# Patient Record
Sex: FEMALE | Race: AMERICAN INDIAN OR ALASKA NATIVE | NOT HISPANIC OR LATINO | Employment: FULL TIME | URBAN - METROPOLITAN AREA
[De-identification: names, ages, dates, MRNs, and addresses within clinical notes are randomized per-mention and may not be internally consistent; named-entity substitution may affect disease eponyms.]

---

## 2017-02-07 ENCOUNTER — OFFICE VISIT (OUTPATIENT)
Dept: MEDICAL GROUP | Facility: MEDICAL CENTER | Age: 37
End: 2017-02-07
Payer: COMMERCIAL

## 2017-02-07 VITALS
SYSTOLIC BLOOD PRESSURE: 112 MMHG | HEART RATE: 89 BPM | BODY MASS INDEX: 33.34 KG/M2 | HEIGHT: 62 IN | WEIGHT: 181.2 LBS | DIASTOLIC BLOOD PRESSURE: 88 MMHG | OXYGEN SATURATION: 91 % | TEMPERATURE: 98.6 F

## 2017-02-07 DIAGNOSIS — Z01.419 ENCOUNTER FOR GYNECOLOGICAL EXAMINATION: ICD-10-CM

## 2017-02-07 DIAGNOSIS — G89.29 CHRONIC PAIN OF RIGHT KNEE: ICD-10-CM

## 2017-02-07 DIAGNOSIS — M25.561 CHRONIC PAIN OF RIGHT KNEE: ICD-10-CM

## 2017-02-07 PROCEDURE — 99214 OFFICE O/P EST MOD 30 MIN: CPT | Performed by: FAMILY MEDICINE

## 2017-02-07 RX ORDER — IBUPROFEN 200 MG
400 TABLET ORAL EVERY 4 HOURS PRN
COMMUNITY
Start: 2017-02-07

## 2017-02-07 ASSESSMENT — PATIENT HEALTH QUESTIONNAIRE - PHQ9: CLINICAL INTERPRETATION OF PHQ2 SCORE: 0

## 2017-02-07 NOTE — MR AVS SNAPSHOT
"        Kristin Sequeira   2017 3:40 PM   Office Visit   MRN: [id number]    Department:  [place] [address]   Dept Phone:  [phone]    Description:  Female : 1980   Provider:  Asif Chopra M.D.           Reason for Visit     Establish Care (R) knee dislocated 4 months ago      Allergies as of 2017     Allergen Noted Reactions    Shellfish Allergy 2017   Anaphylaxis, Hives      You were diagnosed with     Chronic pain of right knee   [7169825]       Encounter for gynecological examination   [[id number]]         Vital Signs     Blood Pressure Pulse Temperature Height Weight Body Mass Index    112/88 mmHg 89 37 °C (98.6 °F) 1.575 m (5' 2.01\") 82.192 kg (181 lb 3.2 oz) 33.13 kg/m2    Oxygen Saturation Last Menstrual Period Breastfeeding? Smoking Status          91% 2017 No Light Tobacco Smoker        Basic Information     Date Of Birth Sex Race Ethnicity Preferred Language    1980 Female  or  Non- English      Your appointments     Oct 11, 2017  4:20 PM   ANNUAL EXAM PREVENTATIVE with Asif Chopra M.D.   [facility]    [address]  [address]  [place] [address] [phone]              Problem List              ICD-10-CM Priority Class Noted - Resolved    Chronic pain of right knee M25.561, G89.29   2017 - Present      Health Maintenance        Date Due Completion Dates    IMM DTaP/Tdap/Td Vaccine (1 - Tdap) 1998 ---    PAP SMEAR 2000 ---    IMM INFLUENZA (1) 2016 ---            Current Immunizations     No immunizations on file.      Below and/or attached are the medications your provider expects you to take. Review all of your home medications and newly ordered medications with your provider and/or pharmacist. Follow medication instructions as directed by your provider and/or pharmacist. Please keep your medication list with you and share with your provider. " Update the information when medications are discontinued, doses are changed, or new medications (including over-the-counter products) are added; and carry medication information at all times in the event of emergency situations     Allergies:  SHELLFISH ALLERGY - Anaphylaxis,Hives               Medications  Valid as of: February 07, 2017 -  4:29 PM    Generic Name Brand Name Tablet Size Instructions for use    Ibuprofen (Tab) MOTRIN 200 MG Take 2 Tabs by mouth every four hours as needed.        .                 Medicines prescribed today were sent to:     [facility] [address] [place] [address], [place] [address] [facility] [address]  [address] [facility] [address]    [address] [place] [address]    Phone: [phone] Fax: [phone]    Open 24 Hours?: No      Medication refill instructions:       If your prescription bottle indicates you have medication refills left, it is not necessary to call your provider’s office. Please contact your pharmacy and they will refill your medication.    If your prescription bottle indicates you do not have any refills left, you may request refills at any time through one of the following ways: The online CHEQROOM system (except Urgent Care), by calling your provider’s office, or by asking your pharmacy to contact your provider’s office with a refill request. Medication refills are processed only during regular business hours and may not be available until the next business day. Your provider may request additional information or to have a follow-up visit with you prior to refilling your medication.   *Please Note: Medication refills are assigned a new Rx number when refilled electronically. Your pharmacy may indicate that no refills were authorized even though a new prescription for the same medication is available at the pharmacy. Please request the medicine by name with the pharmacy before contacting your provider for a refill.        Referral     A referral request has been  sent to our patient care coordination department. Please allow 3-5 business days for us to process this request and contact you either by phone or mail. If you do not hear from us by the 5th business day, please call us at [phone].        Instructions    Tips for Musculoskeletal pain:     1) RICE therapy:    Rest the injured area as much as possible for the first 24-48hours after the   injury.  Then, think of ways to modify your activity to not re-injure the   same area or cause new injury to a different musculoskeletal group.   Ice the injury whenever there is inflammation/swelling, increased warmth to the   joint, or at the end of a long day of use.  Do not place ice directly on skin,   and use ice therapy for no longer than 15-20 minutes at a time.   Compresses.  In addition to the ice compresses:     a) Use warm moist compresses (either a heating pad or a  clean sock    filled with rice or beans then microwaved to a comfortable     warmth) for 15-20minutes, particularly first thing in the morning.     An alternative could be to have a warm to hot shower     (non-scalding) beat directly on the affected area.    b) A compressive bandage may be used to provide support, decrease    swelling, and potentially improve comfort.   Elevate affected area to above the level of your heart.  This increases lymphatic   drainage from the affected area, and thus decreases swelling/pain.     2) Medications:   NSAID Therapy to decrease swelling/inflammation of muscles, and nerves, as   well as to provide pain relief: Ibuprofen 400mg by mouth up to every 4 hours as needed   Topical analgesia: Patches (Menthol, Methyl Salicylate, Camphor, Capsaicin, Lidocaine) or Creams or Balms (Menthol, Methyl Salicylate, Camphor, Capsaicin)     3) Physical Therapy and General Instructions:   Begin normal activities as pain recedes.           [employer]UKZ Undergarments Access Code: [email or web address]  Expires: 2/10/2017  3:05 PM    TUKZ Undergarments  A secure, online  tool to manage your health information     [employer]’s AJAX Street® is a secure, online tool that connects you to your personalized health information from the privacy of your home -- day or night - making it very easy for you to manage your healthcare. Once the activation process is completed, you can even access your medical information using the AJAX Street lida, which is available for free in the Apple Lida store or Google Play store.     AJAX Street provides the following levels of access (as shown below):   My Chart Features   [facility] Doctor [facility]  Specialists [facility]  Urgent  Care [facility]  Primary Care  Doctor   Email your healthcare team securely and privately 24/7 X X X    Manage appointments: schedule your next appointment; view details of past/upcoming appointments X      Request prescription refills. X      View recent personal medical records, including lab and immunizations X X X X   View health record, including health history, allergies, medications X X X X   Read reports about your outpatient visits, procedures, consult and ER notes X X X X   See your discharge summary, which is a recap of your hospital and/or ER visit that includes your diagnosis, lab results, and care plan. X X       How to register for AJAX Street:  1. Go to  [email or web address].  2. Click on the Sign Up Now box, which takes you to the New Member Sign Up page. You will need to provide the following information:  a. Enter your AJAX Street Access Code exactly as it appears at the top of this page. (You will not need to use this code after you’ve completed the sign-up process. If you do not sign up before the expiration date, you must request a new code.)   b. Enter your date of birth.   c. Enter your home email address.   d. Click Submit, and follow the next screen’s instructions.  3. Create a AJAX Street ID. This will be your AJAX Street login ID and cannot be changed, so think of one that is secure and easy to remember.  4. Create a  Apps Genius password. You can change your password at any time.  5. Enter your Password Reset Question and Answer. This can be used at a later time if you forget your password.   6. Enter your e-mail address. This allows you to receive e-mail notifications when new information is available in Apps Genius.  7. Click Sign Up. You can now view your health information.    For assistance activating your Apps Genius account, call [phone]

## 2017-02-08 NOTE — ASSESSMENT & PLAN NOTE
"Patient currently with R knee pain and feelings of right knee instability. Patient states that she does have some paresthesias/tingling into her calf and ankle when first awakening, that resolves after about an hour after waking up and be more mobile.    Patient with history of right knee dislocation while playing softball in November 2016. Patient states that she was running to catch a ball, somehow twisted her right leg, and \"kneecap ended up on the inside of my leg.\"  Patient's friends then pulled the knee into position, she then placed an elastic brace on her right knee than complaining supple. Afterwards, patient went out with her friends, then presented to medical evaluation the next day. The x-rays were performed, and review of the revealed that she did not have any acute fractures. Patient was advised to use RICE therapy, does not recall being told to do physical therapy.      "

## 2017-02-08 NOTE — PATIENT INSTRUCTIONS
Tips for Musculoskeletal pain:     1) RICE therapy:    Rest the injured area as much as possible for the first 24-48hours after the   injury.  Then, think of ways to modify your activity to not re-injure the   same area or cause new injury to a different musculoskeletal group.   Ice the injury whenever there is inflammation/swelling, increased warmth to the   joint, or at the end of a long day of use.  Do not place ice directly on skin,   and use ice therapy for no longer than 15-20 minutes at a time.   Compresses.  In addition to the ice compresses:     a) Use warm moist compresses (either a heating pad or a  clean sock    filled with rice or beans then microwaved to a comfortable     warmth) for 15-20minutes, particularly first thing in the morning.     An alternative could be to have a warm to hot shower     (non-scalding) beat directly on the affected area.    b) A compressive bandage may be used to provide support, decrease    swelling, and potentially improve comfort.   Elevate affected area to above the level of your heart.  This increases lymphatic   drainage from the affected area, and thus decreases swelling/pain.     2) Medications:   NSAID Therapy to decrease swelling/inflammation of muscles, and nerves, as   well as to provide pain relief: Ibuprofen 400mg by mouth up to every 4 hours as needed   Topical analgesia: Patches (Menthol, Methyl Salicylate, Camphor, Capsaicin, Lidocaine) or Creams or Balms (Menthol, Methyl Salicylate, Camphor, Capsaicin)     3) Physical Therapy and General Instructions:   Begin normal activities as pain recedes.

## 2017-02-10 NOTE — PROGRESS NOTES
"Subjective:     Chief Complaint   Patient presents with   • Establish Care     (R) knee dislocated 4 months ago       History of Present Illness:  Kristin Sequeira is a 36 y.o. female patient new to [facility] who presents today to discuss R knee pain as well as to establish primary medical care. PMH, PSH, Social History, Medications, Allergies, FMH all reviewed as documented:    Chronic pain of right knee  Patient currently with R knee pain and feelings of right knee instability. Patient states that she does have some paresthesias/tingling into her calf and ankle when first awakening, that resolves after about an hour after waking up and be more mobile.    Patient with history of right knee dislocation while playing softball in November 2016. Patient states that she was running to catch a ball, somehow twisted her right leg, and \"kneecap ended up on the inside of my leg.\"  Patient's friends then pulled the knee into position, she then placed an elastic brace on her right knee than complaining supple. Afterwards, patient went out with her friends, then presented to medical evaluation the next day. The x-rays were performed, and review of the revealed that she did not have any acute fractures. Patient was advised to use RICE therapy, does not recall being told to do physical therapy.          Patient Active Problem List    Diagnosis Date Noted   • Chronic pain of right knee 02/07/2017       Additional History:   Allergies:    Shellfish allergy     Medications:     Current Outpatient Prescriptions Ordered in [facility]   Medication Sig Dispense Refill   • ibuprofen (MOTRIN) 200 MG Tab Take 2 Tabs by mouth every four hours as needed.       No current Epic-ordered facility-administered medications on file.        Past Medical History:   History reviewed. No pertinent past medical history.     Past Surgical History:     Past Surgical History   Procedure Laterality Date   • Tubal coagulation laparoscopic bilateral   " "       Social History:     Social History   Substance Use Topics   • Smoking status: Light Tobacco Smoker   • Smokeless tobacco: Never Used      Comment: 1 CIG EVERY 2 WEEKS   • Alcohol Use: 1.8 oz/week     3 Cans of beer per week        Family History:     Family Status   Relation Status Death Age   • Mother     • Father Alive    • Sister Alive    • Maternal Grandmother     • Maternal Grandfather     • Paternal Grandmother     • Paternal Grandfather          Family History   Problem Relation Age of Onset   • Cancer Mother      \"Bone Cancer\"   • Kidney cancer Father    • Bladder cancer Father    • Alcohol/Drug Father      Smoker   • Cancer Father    • No Known Problems Sister    • Cancer Paternal Grandmother      Breast CA   • Hypertension Paternal Grandmother    • Cancer Paternal Grandfather      Colon, Lung       ROS:     - Constitutional: Negative for fever, chills, unexpected weight change, and fatigue/generalized weakness.     - HEENT: Negative for headaches, vision changes, hearing changes, ear pain, ear discharge, rhinorrhea, sinus congestion, sore throat, and neck pain.      - Respiratory: Negative for cough, sputum production, chest congestion, dyspnea, wheezing, and crackles.      - Cardiovascular: Negative for chest pain, palpitations, orthopnea, and bilateral lower extremity edema.     - Gastrointestinal: Negative for heartburn, nausea, vomiting, abdominal pain, hematochezia, melena, diarrhea, constipation, and greasy/foul-smelling stools.     - Genitourinary: Negative for dysuria, polyuria, hematuria, pyuria, urinary urgency, and urinary incontinence.    - Musculoskeletal: Negative for myalgias, back pain, and joint pain.     - Skin: Negative for rash, itching, cyanotic skin color change.     - Neurological: Negative for dizziness, tingling, tremors, focal sensory deficit, focal weakness and headaches.     - NOTE: All other systems reviewed and are negative, " "except as in HPI.     Objective:   Physical Exam:    Vitals: Blood pressure 112/88, pulse 89, temperature 37 °C (98.6 °F), height 1.575 m (5' 2.01\"), weight 82.192 kg (181 lb 3.2 oz), last menstrual period 01/27/2017, SpO2 91 %, not currently breastfeeding.   BMI: Body mass index is 33.13 kg/(m^2).   General/Constitutional: Vitals as above, Well nourished, well developed female in no acute distress   Head/Eyes:  - Head is grossly normal & atraumatic  - Bilateral conjunctivae clear and not injected, bilateral EOMI, bilateral PERRL   ENT:   - Bilateral external ears grossly normal in appearance, external auditory canals clear & bilateral TMs visualized with appropriate cone of light reflex, hearing grossly intact  - External nares normal in appearance and without discharge/bleeding, bilateral turbinates non-erythematous/non-edematous and without discharge/bleeding  - Good dentition ,  posterior oropharynx without erythema/edema/exudates  Neck: Neck supple, no masses, neck non-tender to palpation, no thyromegaly/goiter   Respiratory: No respiratory distress, bilateral lungs are clear to auscultation in all lung fields (anterior/lateral/posterior), no wheezing/rhonchi/rales   Cardiovascular: Regular rate and rhythm without murmur/gallops/rubs, distal pulses equal and 2+ bilaterally (radial, posterior tibial), no bilateral lower extremity edema   Gastrointestinal: Abdomen resonant to percussion, Bowel sounds present in all 4 quadrants, abdomen non-tender to light and deep palpation, hepatosplenomegaly grossly absent, ventral/umbilical hernias absent    MSK: Gait grossly normal & not antalgic, no tenderness to percussion of vertebral processes, no CVAT, no bilateral SI joint tenderness, R knee exam positive for tenderness to direct palpation of medial and lateral joint spaces otherwise negative exam for right knee including (Mable's, anterior/posterior drawer, patellar tendon tenderness to compression, quadriceps " tendon tenderness to compression, tenderness to direct palpation of patella, tenderness to direct palpation of medial and lateral collateral ligaments, patellar mobility),    Integumentary: No apparent rashes   Neuro: Gross motor movement intact in all 4 extremities, Gross sensation intact to extremities and trunk, Gait grossly normal and not antalgic   Psych: Judgment grossly appropriate, no apparent depression/anxiety    Health Maintenance:     - Believes pap is uptodate    - I have requested previous records, and will update accordingly.    Assessment and Plan:   1. Chronic pain of right knee  Stable, secondary to early R knee OA vs. Pain 2/2 compressive effect d/t excess weight.   - ibuprofen (MOTRIN) 200 MG Tab; Take 2 Tabs by mouth every four hours as needed.   - REFERRAL TO PHYSICAL THERAPY Reason for Therapy: Eval/Treat/Report    2. Encounter for gynecological examination  Referral to [facility].   - REFERRAL TO GYNECOLOGY      PLEASE NOTE: This dictation was created using voice recognition software. I have made every reasonable attempt to correct obvious errors, but I expect that there are errors of grammar and possibly content that I did not discover before finalizing the note.

## 2017-02-20 ENCOUNTER — OFFICE VISIT (OUTPATIENT)
Dept: URGENT CARE | Facility: CLINIC | Age: 37
End: 2017-02-20
Payer: COMMERCIAL

## 2017-02-20 VITALS
OXYGEN SATURATION: 96 % | WEIGHT: 175 LBS | SYSTOLIC BLOOD PRESSURE: 110 MMHG | HEIGHT: 62 IN | DIASTOLIC BLOOD PRESSURE: 78 MMHG | RESPIRATION RATE: 16 BRPM | TEMPERATURE: 98.6 F | BODY MASS INDEX: 32.2 KG/M2 | HEART RATE: 82 BPM

## 2017-02-20 DIAGNOSIS — J02.9 PHARYNGITIS, UNSPECIFIED ETIOLOGY: ICD-10-CM

## 2017-02-20 DIAGNOSIS — J02.0 STREP THROAT: ICD-10-CM

## 2017-02-20 LAB
INT CON NEG: NORMAL
INT CON POS: NORMAL
S PYO AG THROAT QL: NORMAL

## 2017-02-20 PROCEDURE — 99214 OFFICE O/P EST MOD 30 MIN: CPT | Performed by: NURSE PRACTITIONER

## 2017-02-20 PROCEDURE — 87880 STREP A ASSAY W/OPTIC: CPT | Performed by: NURSE PRACTITIONER

## 2017-02-20 RX ORDER — AMOXICILLIN 500 MG/1
500 CAPSULE ORAL 2 TIMES DAILY
Qty: 20 CAP | Refills: 0 | Status: SHIPPED | OUTPATIENT
Start: 2017-02-20 | End: 2017-03-02

## 2017-02-20 ASSESSMENT — ENCOUNTER SYMPTOMS
DIAPHORESIS: 0
WEAKNESS: 0
NECK PAIN: 0
HEADACHES: 1
COUGH: 0
VOMITING: 0
FEVER: 0
CHILLS: 0
NAUSEA: 1
DIARRHEA: 0
ABDOMINAL PAIN: 0
MYALGIAS: 0
SORE THROAT: 1

## 2017-02-20 NOTE — MR AVS SNAPSHOT
"        Kristin Sequeira   2017 12:30 PM   Office Visit   MRN: [id number]    Department:  [facility]   Dept Phone:  [phone]    Description:  Female : 1980   Provider:  GERRI Mcdonald           Reason for Visit     Pharyngitis sore throat/ headache X 1 week       Allergies as of 2017     Allergen Noted Reactions    Shellfish Allergy 2017   Anaphylaxis, Hives      You were diagnosed with     Strep throat   [[id number]]       Pharyngitis, unspecified etiology   [[id number]]         Vital Signs     Blood Pressure Pulse Temperature Respirations Height Weight    110/78 mmHg 82 37 °C (98.6 °F) 16 1.575 m (5' 2\") 79.379 kg (175 lb)    Body Mass Index Oxygen Saturation Last Menstrual Period Smoking Status          32.00 kg/m2 96% 2017 Light Tobacco Smoker        Basic Information     Date Of Birth Sex Race Ethnicity Preferred Language    1980 Female  or  Non- English      Your appointments     Oct 11, 2017  4:20 PM   ANNUAL EXAM PREVENTATIVE with Asif Chopra M.D.   [facility] ([facility])    [address]  [address]  [place] [address] [phone]              Problem List              ICD-10-CM Priority Class Noted - Resolved    Chronic pain of right knee M25.561, G89.29   2017 - Present      Health Maintenance        Date Due Completion Dates    IMM DTaP/Tdap/Td Vaccine (1 - Tdap) 1998 ---    PAP SMEAR 2000 ---    IMM INFLUENZA (1) 2016 ---            Current Immunizations     No immunizations on file.      Below and/or attached are the medications your provider expects you to take. Review all of your home medications and newly ordered medications with your provider and/or pharmacist. Follow medication instructions as directed by your provider and/or pharmacist. Please keep your medication list with you and share with your provider. Update the information when " medications are discontinued, doses are changed, or new medications (including over-the-counter products) are added; and carry medication information at all times in the event of emergency situations     Allergies:  SHELLFISH ALLERGY - Anaphylaxis,Hives               Medications  Valid as of: February 20, 2017 -  1:30 PM    Generic Name Brand Name Tablet Size Instructions for use    Amoxicillin (Cap) AMOXIL 500 MG Take 1 Cap by mouth 2 times a day for 10 days.        Ibuprofen (Tab) MOTRIN 200 MG Take 2 Tabs by mouth every four hours as needed.        [id number]   Take  by mouth.        .                 Medicines prescribed today were sent to:     [facility] [address] [place] [address], [place] [address] [facility] [address] [facility] [address]    [address] [facility] [address] [place] [address]    Phone: [phone] Fax: [phone]    Open 24 Hours?: No      Medication refill instructions:       If your prescription bottle indicates you have medication refills left, it is not necessary to call your provider’s office. Please contact your pharmacy and they will refill your medication.    If your prescription bottle indicates you do not have any refills left, you may request refills at any time through one of the following ways: The online [employer]Lane system (except Urgent Care), by calling your provider’s office, or by asking your pharmacy to contact your provider’s office with a refill request. Medication refills are processed only during regular business hours and may not be available until the next business day. Your provider may request additional information or to have a follow-up visit with you prior to refilling your medication.   *Please Note: Medication refills are assigned a new Rx number when refilled electronically. Your pharmacy may indicate that no refills were authorized even though a new prescription for the same medication is available at the pharmacy. Please request the medicine by name with the  pharmacy before contacting your provider for a refill.           MyChart Access Code: Activation code not generated  Current MyChart Status: Active

## 2017-02-20 NOTE — PROGRESS NOTES
"Subjective:      Kristin Sequeira is a 36 y.o. female who presents with Pharyngitis            Pharyngitis   Associated symptoms include headaches. Pertinent negatives include no abdominal pain, congestion, coughing, diarrhea, ear pain, neck pain or vomiting.   Patient comes in today with a 1 week history of pharyngitis and headache.  She reports feeling worse now than at onset.  Denies any nasal congestion or cough.  She is taking OTC cold medication with minimal relief.  No fever but \"feels hot\".  No myalgias.       Review of Systems   Constitutional: Positive for malaise/fatigue. Negative for fever, chills and diaphoresis.   HENT: Positive for sore throat. Negative for congestion and ear pain.    Respiratory: Negative for cough.    Cardiovascular: Negative for chest pain.   Gastrointestinal: Positive for nausea. Negative for vomiting, abdominal pain and diarrhea.   Musculoskeletal: Negative for myalgias and neck pain.   Skin: Negative for rash.   Neurological: Positive for headaches. Negative for weakness.     Medications, Allergies, and current problem list reviewed today in Epic     Objective:     /78 mmHg  Pulse 82  Temp(Src) 37 °C (98.6 °F)  Resp 16  Ht 1.575 m (5' 2\")  Wt 79.379 kg (175 lb)  BMI 32.00 kg/m2  SpO2 96%  LMP 01/27/2017     Physical Exam   Constitutional: She is oriented to person, place, and time. She appears well-developed and well-nourished. No distress.   HENT:   Head: Normocephalic.   Nose: Nose normal.   Mouth/Throat: No oropharyngeal exudate.   Nares patent.  No sinus pain.  Bilateral clear middle ear effusion with slight TM injection on the left.    Oropharyngeal erythema with no exudate or edema.  Uvula midline.   Eyes: Conjunctivae are normal. Pupils are equal, round, and reactive to light. Right eye exhibits no discharge. Left eye exhibits no discharge. No scleral icterus.   Neck: Neck supple. No JVD present. No tracheal deviation present. No thyromegaly present. "   Cardiovascular: Normal rate, regular rhythm and normal heart sounds.  Exam reveals no gallop and no friction rub.    No murmur heard.  Pulmonary/Chest: Effort normal and breath sounds normal. No stridor. No respiratory distress. She has no wheezes. She has no rales. She exhibits no tenderness.   Musculoskeletal: She exhibits no edema.   Lymphadenopathy:     She has no cervical adenopathy.   Neurological: She is alert and oriented to person, place, and time.   Skin: Skin is warm and dry. No rash noted. She is not diaphoretic. No erythema.   Vitals reviewed.       POCT rapid strep a: positive       Assessment/Plan:     1. Strep throat  - amoxicillin (AMOXIL) 500 MG Cap; Take 1 Cap by mouth 2 times a day for 10 days.  Dispense: 20 Cap; Refill: 0    2. Pharyngitis, unspecified etiology  - POCT Rapid Strep A    Take full course of antibiotics.  OTC NSAIDs or tylenol prn fever, pain.  OTC throat sprays or lozenges prn symptom management.  Maintain adequate po hydration.  RTC in 5-7 days if symptoms persist, sooner if worse.  Patient verbalized understanding of and agreed with plan of care.

## 2018-08-13 ENCOUNTER — OFFICE VISIT (OUTPATIENT)
Dept: URGENT CARE | Facility: PHYSICIAN GROUP | Age: 38
End: 2018-08-13
Payer: COMMERCIAL

## 2018-08-13 VITALS
WEIGHT: 198 LBS | RESPIRATION RATE: 16 BRPM | HEIGHT: 62 IN | OXYGEN SATURATION: 95 % | HEART RATE: 91 BPM | TEMPERATURE: 98.2 F | DIASTOLIC BLOOD PRESSURE: 82 MMHG | SYSTOLIC BLOOD PRESSURE: 122 MMHG | BODY MASS INDEX: 36.44 KG/M2

## 2018-08-13 DIAGNOSIS — J00 ACUTE RHINITIS: ICD-10-CM

## 2018-08-13 DIAGNOSIS — R06.02 SOB (SHORTNESS OF BREATH): ICD-10-CM

## 2018-08-13 DIAGNOSIS — J06.9 URI WITH COUGH AND CONGESTION: ICD-10-CM

## 2018-08-13 PROCEDURE — 99214 OFFICE O/P EST MOD 30 MIN: CPT | Performed by: PHYSICIAN ASSISTANT

## 2018-08-13 RX ORDER — AZITHROMYCIN 250 MG/1
TABLET, FILM COATED ORAL
Qty: 6 TAB | Refills: 0 | Status: SHIPPED | OUTPATIENT
Start: 2018-08-13

## 2018-08-13 RX ORDER — METHYLPREDNISOLONE 4 MG/1
TABLET ORAL
Qty: 21 TAB | Refills: 0 | Status: SHIPPED | OUTPATIENT
Start: 2018-08-13

## 2018-08-13 NOTE — LETTER
August 13, 2018         Patient: Kristin Sequeira   YOB: 1980   Date of Visit: 8/13/2018           To Whom it May Concern:    Kristin Sequeira was seen in my clinic on 8/13/2018. She may return to work on 8/14/18.    If you have any questions or concerns, please don't hesitate to call.        Sincerely,           Kaylene Call P.A.-C.  Electronically Signed

## 2018-08-13 NOTE — PROGRESS NOTES
"Chief Complaint   Patient presents with   • Cough   • Pharyngitis   • Wheezing   • Nasal Congestion       HISTORY OF PRESENT ILLNESS: Patient is a 38 y.o. female who presents today for the following:    Cough x 1 week  + green sputum production, SOB, nasal congestion, ST  Denies ear pain, fever  OTC meds tried: mucinex     Patient Active Problem List    Diagnosis Date Noted   • Chronic pain of right knee 2017       Allergies:Shellfish allergy    Current Outpatient Prescriptions Ordered in [facility]   Medication Sig Dispense Refill   • MethylPREDNISolone (MEDROL DOSEPAK) 4 MG Tablet Therapy Pack Use as package directs 21 Tab 0   • azithromycin (ZITHROMAX) 250 MG Tab Use as package directs. Fill if symptoms worsen at any point OR if they fail to improve over the next 7-10 days 6 Tab 0   • Hvsxlbfaw-ETE-KA-APAP (TYLENOL COLD PO) Take  by mouth.     • ibuprofen (MOTRIN) 200 MG Tab Take 2 Tabs by mouth every four hours as needed.       No current Epic-ordered facility-administered medications on file.        History reviewed. No pertinent past medical history.    Social History   Substance Use Topics   • Smoking status: Former Smoker   • Smokeless tobacco: Never Used      Comment: 1 CIG EVERY 2 WEEKS   • Alcohol use 1.8 oz/week     3 Cans of beer per week       Family Status   Relation Status   • Mo    • Fa Alive   • Sis Alive   • MGMo    • MGFa    • PGMo    • PGFa      Family History   Problem Relation Age of Onset   • Cancer Mother         \"Bone Cancer\"   • Kidney cancer Father    • Bladder cancer Father    • Alcohol/Drug Father         Smoker   • Cancer Father    • No Known Problems Sister    • Cancer Paternal Grandmother         Breast CA   • Hypertension Paternal Grandmother    • Cancer Paternal Grandfather         Colon, Lung       Review of Systems:   Constitutional ROS: No unexpected change in weight, No weakness, No fatigue  Eye ROS: No recent significant change in " "vision, No eye pain, redness, discharge  Ear ROS: No drainage, No tinnitus or vertigo, No recent change in hearing  Mouth/Throat ROS: No teeth or gum problems, No bleeding gums, No tongue complaints  Neck ROS: No swollen glands, No significant pain in neck  Cardiovascular ROS: No diaphoresis, No edema, No palpitations  Gastrointestinal ROS: No change in bowel habits, No significant change in appetite, No nausea, vomiting, diarrhea, or constipation  Musculoskeletal/Extremities ROS: No peripheral edema, No pain, redness or swelling on the joints  Hematologic/Lymphatic ROS: No chills, No night sweats, No weight loss  Skin/Integumentary ROS: No edema, No evidence of rash, No itching      Exam:  Blood pressure 122/82, pulse 91, temperature 36.8 °C (98.2 °F), resp. rate 16, height 1.575 m (5' 2\"), weight 89.8 kg (198 lb), last menstrual period 08/06/2018, SpO2 95 %.  General: Well developed, well nourished. No distress.  Eye: PERRL/EOMI; conjunctivae clear, lids normal.  ENMT: Lips without lesions, MMM. Oropharynx is clear. Bilateral TMs are within normal limits.  Pulmonary: Unlabored respiratory effort. Lungs clear to auscultation, no wheezes, no rhonchi. Very slight decrease in breath sounds throughout.  Cardiovascular: Regular rate and rhythm without murmur.    Neurologic: Grossly nonfocal. No facial asymmetry noted.  Lymph: No cervical lymphadenopathy noted.  Skin: Warm, dry, good turgor. No rashes in visible areas.   Psych: Normal mood. Alert and oriented x3. Judgment and insight is normal.    Assessment/Plan:  Vital signs are stable. Patient is nontoxic in appearance.  Discussed likely viral etiology. Discussed appropriate over-the-counter symptomatic medication, and when to return to clinic. Contingent antibiotic prescription given to patient to fill upon meeting criteria of guidelines discussed.   1. URI with cough and congestion  azithromycin (ZITHROMAX) 250 MG Tab   2. Acute rhinitis     3. SOB (shortness of " breath)  MethylPREDNISolone (MEDROL DOSEPAK) 4 MG Tablet Therapy Pack

## 2020-04-01 ENCOUNTER — HOSPITAL ENCOUNTER (OUTPATIENT)
Dept: LAB | Facility: MEDICAL CENTER | Age: 40
End: 2020-04-01
Attending: OBSTETRICS & GYNECOLOGY
Payer: COMMERCIAL

## 2020-04-01 ENCOUNTER — HOSPITAL ENCOUNTER (OUTPATIENT)
Dept: LAB | Facility: MEDICAL CENTER | Age: 40
End: 2020-04-01
Attending: NURSE PRACTITIONER
Payer: COMMERCIAL

## 2020-04-01 LAB
BASOPHILS # BLD AUTO: 0.9 % (ref 0–1.8)
BASOPHILS # BLD: 0.07 K/UL (ref 0–0.12)
EOSINOPHIL # BLD AUTO: 0.36 K/UL (ref 0–0.51)
EOSINOPHIL NFR BLD: 4.4 % (ref 0–6.9)
ERYTHROCYTE [DISTWIDTH] IN BLOOD BY AUTOMATED COUNT: 45.4 FL (ref 35.9–50)
HCT VFR BLD AUTO: 46.5 % (ref 37–47)
HGB BLD-MCNC: 15.5 G/DL (ref 12–16)
IMM GRANULOCYTES # BLD AUTO: 0.01 K/UL (ref 0–0.11)
IMM GRANULOCYTES NFR BLD AUTO: 0.1 % (ref 0–0.9)
LYMPHOCYTES # BLD AUTO: 2.4 K/UL (ref 1–4.8)
LYMPHOCYTES NFR BLD: 29.2 % (ref 22–41)
MCH RBC QN AUTO: 31.3 PG (ref 27–33)
MCHC RBC AUTO-ENTMCNC: 33.3 G/DL (ref 33.6–35)
MCV RBC AUTO: 93.8 FL (ref 81.4–97.8)
MONOCYTES # BLD AUTO: 0.78 K/UL (ref 0–0.85)
MONOCYTES NFR BLD AUTO: 9.5 % (ref 0–13.4)
NEUTROPHILS # BLD AUTO: 4.61 K/UL (ref 2–7.15)
NEUTROPHILS NFR BLD: 55.9 % (ref 44–72)
NRBC # BLD AUTO: 0 K/UL
NRBC BLD-RTO: 0 /100 WBC
PLATELET # BLD AUTO: 378 K/UL (ref 164–446)
PMV BLD AUTO: 10.4 FL (ref 9–12.9)
RBC # BLD AUTO: 4.96 M/UL (ref 4.2–5.4)
SPECIMEN SOURCE: NORMAL
WBC # BLD AUTO: 8.2 K/UL (ref 4.8–10.8)

## 2020-04-01 PROCEDURE — 80053 COMPREHEN METABOLIC PANEL: CPT

## 2020-04-01 PROCEDURE — 86803 HEPATITIS C AB TEST: CPT

## 2020-04-01 PROCEDURE — 83516 IMMUNOASSAY NONANTIBODY: CPT

## 2020-04-01 PROCEDURE — 85025 COMPLETE CBC W/AUTO DIFF WBC: CPT

## 2020-04-01 PROCEDURE — 87340 HEPATITIS B SURFACE AG IA: CPT

## 2020-04-01 PROCEDURE — 86780 TREPONEMA PALLIDUM: CPT

## 2020-04-01 PROCEDURE — 36415 COLL VENOUS BLD VENIPUNCTURE: CPT

## 2020-04-01 PROCEDURE — 86140 C-REACTIVE PROTEIN: CPT

## 2020-04-01 PROCEDURE — 82784 ASSAY IGA/IGD/IGG/IGM EACH: CPT

## 2020-04-02 ENCOUNTER — HOSPITAL ENCOUNTER (OUTPATIENT)
Facility: MEDICAL CENTER | Age: 40
End: 2020-04-02
Attending: OBSTETRICS & GYNECOLOGY
Payer: COMMERCIAL

## 2020-04-02 ENCOUNTER — HOSPITAL ENCOUNTER (OUTPATIENT)
Facility: MEDICAL CENTER | Age: 40
End: 2020-04-02
Attending: NURSE PRACTITIONER
Payer: COMMERCIAL

## 2020-04-02 LAB
ALBUMIN SERPL BCP-MCNC: 4.2 G/DL (ref 3.2–4.9)
ALBUMIN/GLOB SERPL: 1.4 G/DL
ALP SERPL-CCNC: 75 U/L (ref 30–99)
ALT SERPL-CCNC: 28 U/L (ref 2–50)
ANION GAP SERPL CALC-SCNC: 13 MMOL/L (ref 7–16)
AST SERPL-CCNC: 16 U/L (ref 12–45)
BILIRUB SERPL-MCNC: 0.2 MG/DL (ref 0.1–1.5)
BUN SERPL-MCNC: 11 MG/DL (ref 8–22)
C DIFF DNA SPEC QL NAA+PROBE: NEGATIVE
C DIFF TOX GENS STL QL NAA+PROBE: NEGATIVE
CALCIUM SERPL-MCNC: 9.1 MG/DL (ref 8.5–10.5)
CANCELLED TEST NOTIFICATION CANTE: NORMAL
CHLORIDE SERPL-SCNC: 106 MMOL/L (ref 96–112)
CO2 SERPL-SCNC: 21 MMOL/L (ref 20–33)
CREAT SERPL-MCNC: 0.78 MG/DL (ref 0.5–1.4)
CRP SERPL HS-MCNC: 0.66 MG/DL (ref 0–0.75)
GLOBULIN SER CALC-MCNC: 3.1 G/DL (ref 1.9–3.5)
GLUCOSE SERPL-MCNC: 103 MG/DL (ref 65–99)
HBV SURFACE AG SER QL: NORMAL
HCV AB SER QL: NORMAL
POTASSIUM SERPL-SCNC: 3.6 MMOL/L (ref 3.6–5.5)
PROT SERPL-MCNC: 7.3 G/DL (ref 6–8.2)
SODIUM SERPL-SCNC: 140 MMOL/L (ref 135–145)
TREPONEMA PALLIDUM IGG+IGM AB [PRESENCE] IN SERUM OR PLASMA BY IMMUNOASSAY: NORMAL

## 2020-04-02 PROCEDURE — 87899 AGENT NOS ASSAY W/OPTIC: CPT

## 2020-04-02 PROCEDURE — 87491 CHLMYD TRACH DNA AMP PROBE: CPT

## 2020-04-02 PROCEDURE — 87045 FECES CULTURE AEROBIC BACT: CPT

## 2020-04-02 PROCEDURE — 87493 C DIFF AMPLIFIED PROBE: CPT

## 2020-04-02 PROCEDURE — 87046 STOOL CULTR AEROBIC BACT EA: CPT

## 2020-04-02 PROCEDURE — 87209 SMEAR COMPLEX STAIN: CPT

## 2020-04-02 PROCEDURE — 83993 ASSAY FOR CALPROTECTIN FECAL: CPT

## 2020-04-02 PROCEDURE — 87591 N.GONORRHOEAE DNA AMP PROB: CPT

## 2020-04-02 PROCEDURE — 87177 OVA AND PARASITES SMEARS: CPT

## 2020-04-03 LAB
E COLI SXT1+2 STL IA: NORMAL
IGA SERPL-MCNC: 295 MG/DL (ref 68–408)
SIGNIFICANT IND 70042: NORMAL
SITE SITE: NORMAL
SOURCE SOURCE: NORMAL

## 2020-04-04 LAB
C TRACH DNA SPEC QL NAA+PROBE: NEGATIVE
N GONORRHOEA DNA SPEC QL NAA+PROBE: NEGATIVE
SPECIMEN SOURCE: NORMAL

## 2020-04-05 LAB
BACTERIA STL CULT: NORMAL
CALPROTECTIN STL-MCNT: <16 UG/G
E COLI SXT1+2 STL IA: NORMAL
SIGNIFICANT IND 70042: NORMAL
SITE SITE: NORMAL
SOURCE SOURCE: NORMAL

## 2020-04-06 LAB
GLIADIN PEPTIDE+TTG IGA+IGG SER QL IA: 9 UNITS (ref 0–19)
OVA AND PARASITE, FECAL INTERPRETATION Q0595: NEGATIVE

## 2020-04-08 LAB
GLIADIN IGA SER IA-ACNC: 7 UNITS (ref 0–19)
GLIADIN IGG SER IA-ACNC: 3 UNITS (ref 0–19)
TTG IGA SER IA-ACNC: <2 U/ML (ref 0–3)
TTG IGG SER IA-ACNC: 2 U/ML (ref 0–5)

## 2024-01-16 ENCOUNTER — OFFICE VISIT (OUTPATIENT)
Dept: URGENT CARE | Facility: PHYSICIAN GROUP | Age: 44
End: 2024-01-16
Payer: COMMERCIAL

## 2024-01-16 VITALS
DIASTOLIC BLOOD PRESSURE: 78 MMHG | WEIGHT: 180 LBS | HEART RATE: 102 BPM | BODY MASS INDEX: 33.13 KG/M2 | RESPIRATION RATE: 14 BRPM | HEIGHT: 62 IN | SYSTOLIC BLOOD PRESSURE: 118 MMHG | TEMPERATURE: 97.8 F | OXYGEN SATURATION: 98 %

## 2024-01-16 DIAGNOSIS — J06.9 VIRAL URI: ICD-10-CM

## 2024-01-16 DIAGNOSIS — H66.92 ACUTE LEFT OTITIS MEDIA: ICD-10-CM

## 2024-01-16 PROCEDURE — 99203 OFFICE O/P NEW LOW 30 MIN: CPT | Performed by: STUDENT IN AN ORGANIZED HEALTH CARE EDUCATION/TRAINING PROGRAM

## 2024-01-16 PROCEDURE — 3078F DIAST BP <80 MM HG: CPT | Performed by: STUDENT IN AN ORGANIZED HEALTH CARE EDUCATION/TRAINING PROGRAM

## 2024-01-16 PROCEDURE — 3074F SYST BP LT 130 MM HG: CPT | Performed by: STUDENT IN AN ORGANIZED HEALTH CARE EDUCATION/TRAINING PROGRAM

## 2024-01-16 RX ORDER — AMOXICILLIN AND CLAVULANATE POTASSIUM 875; 125 MG/1; MG/1
1 TABLET, FILM COATED ORAL 2 TIMES DAILY
Qty: 14 TABLET | Refills: 0 | Status: SHIPPED | OUTPATIENT
Start: 2024-01-16 | End: 2024-01-23

## 2024-01-16 NOTE — PROGRESS NOTES
"Subjective:   Kristin Sequeira is a 43 y.o. female who presents for Sinus Pain (X 3 days), Cough, Headache, and Congestion      HPI:  43-year-old female presents to the urgent care for 3 days of nasal congestion, runny nose, sinus pressure, ear fullness bilaterally, intermittent headache, and fatigue.  Also having a dry cough.  States that her son at home has a cold.  She has tried sinus sprays without much improvement.  No fever, chills, nausea, vomiting, chest pain, shortness of breath, wheezing, or dizziness.    Medications:    amoxicillin-clavulanate Tabs  azithromycin Tabs  ibuprofen Tabs  methylPREDNISolone Tbpk  TYLENOL COLD PO    Allergies: Shellfish allergy    Problem List: Kristin Sequeira does not have any pertinent problems on file.    Surgical History:  Past Surgical History:   Procedure Laterality Date    TUBAL COAGULATION LAPAROSCOPIC BILATERAL         Past Social Hx: Kristin Sequeira  reports that she has quit smoking. She has never used smokeless tobacco. She reports current alcohol use of about 1.8 oz of alcohol per week. She reports that she does not use drugs.     Past Family Hx:  Kristin Sequeira family history includes Alcohol/Drug in her father; Bladder cancer in her father; Cancer in her father, mother, paternal grandfather, and paternal grandmother; Hypertension in her paternal grandmother; Kidney cancer in her father; No Known Problems in her sister.     Problem list, medications, and allergies reviewed by myself today in Epic.     Objective:     /78   Pulse (!) 102   Temp 36.6 °C (97.8 °F) (Temporal)   Resp 14   Ht 1.575 m (5' 2\")   Wt 81.6 kg (180 lb)   SpO2 98%   BMI 32.92 kg/m²     Physical Exam  Vitals reviewed.   Constitutional:       Appearance: Normal appearance.   HENT:      Head: Normocephalic.      Right Ear: Hearing, tympanic membrane, ear canal and external ear normal.      Left Ear: Hearing, ear canal and external ear normal. Tympanic membrane is " injected and erythematous.      Nose: Congestion and rhinorrhea present.      Mouth/Throat:      Mouth: Mucous membranes are moist.      Pharynx: No oropharyngeal exudate or posterior oropharyngeal erythema.   Cardiovascular:      Rate and Rhythm: Normal rate and regular rhythm.      Pulses: Normal pulses.      Heart sounds: Normal heart sounds. No murmur heard.  Pulmonary:      Effort: Pulmonary effort is normal. No respiratory distress.      Breath sounds: Normal breath sounds. No stridor. No wheezing, rhonchi or rales.   Lymphadenopathy:      Cervical: No cervical adenopathy.   Neurological:      Mental Status: She is alert.         Assessment/Plan:     Diagnosis and associated orders:     1. Acute left otitis media  amoxicillin-clavulanate (AUGMENTIN) 875-125 MG Tab      2. Viral URI           Comments/MDM:     Patient's presentation physical exam findings are consistent with a viral upper respiratory tract infection which should be self-limited.  Discussed typical timeframe for resolution of symptoms.  She does have secondary acute left otitis media which she will be treated with Augmentin for.  I do not suspect bacterial sinus infection given the length of symptoms.  Continue home supportive care with nasal sprays and Mucinex.  Vitals are stable.  Patient is well-appearing and nontoxic.  Return precautions given.  Pulmonary exam shows no abnormal findings.  No signs of pneumonia.         Differential diagnosis, natural history, supportive care, and indications for immediate follow-up discussed.    Advised the patient to follow-up with the primary care physician for recheck, reevaluation, and consideration of further management.    Please note that this dictation was created using voice recognition software. I have made a reasonable attempt to correct obvious errors, but I expect that there are errors of grammar and possibly content that I did not discover before finalizing the note.    Electronically signed by  Kranthi Piper PA-C.